# Patient Record
Sex: MALE | Race: BLACK OR AFRICAN AMERICAN | NOT HISPANIC OR LATINO | Employment: FULL TIME | URBAN - METROPOLITAN AREA
[De-identification: names, ages, dates, MRNs, and addresses within clinical notes are randomized per-mention and may not be internally consistent; named-entity substitution may affect disease eponyms.]

---

## 2020-05-18 ENCOUNTER — HOSPITAL ENCOUNTER (EMERGENCY)
Facility: OTHER | Age: 40
Discharge: HOME | End: 2020-05-18
Attending: EMERGENCY MEDICINE

## 2020-05-18 VITALS
RESPIRATION RATE: 18 BRPM | OXYGEN SATURATION: 99 % | TEMPERATURE: 98 F | SYSTOLIC BLOOD PRESSURE: 143 MMHG | BODY MASS INDEX: 25.45 KG/M2 | WEIGHT: 192 LBS | HEIGHT: 73 IN | DIASTOLIC BLOOD PRESSURE: 97 MMHG | HEART RATE: 93 BPM

## 2020-05-18 DIAGNOSIS — G54.0 NEUROGENIC THORACIC OUTLET SYNDROME: ICD-10-CM

## 2020-05-18 PROCEDURE — 96361 HYDRATE IV INFUSION ADD-ON: CPT

## 2020-05-18 PROCEDURE — 25000003 PHARM REV CODE 250: Performed by: EMERGENCY MEDICINE

## 2020-05-18 PROCEDURE — 96374 THER/PROPH/DIAG INJ IV PUSH: CPT

## 2020-05-18 PROCEDURE — 63600175 PHARM REV CODE 636 W HCPCS: Performed by: EMERGENCY MEDICINE

## 2020-05-18 PROCEDURE — 99284 EMERGENCY DEPT VISIT MOD MDM: CPT | Mod: 25

## 2020-05-18 RX ORDER — BUTALBITAL, ACETAMINOPHEN AND CAFFEINE 50; 325; 40 MG/1; MG/1; MG/1
1 TABLET ORAL
Status: COMPLETED | OUTPATIENT
Start: 2020-05-18 | End: 2020-05-18

## 2020-05-18 RX ORDER — SODIUM CHLORIDE 9 MG/ML
1000 INJECTION, SOLUTION INTRAVENOUS
Status: COMPLETED | OUTPATIENT
Start: 2020-05-18 | End: 2020-05-18

## 2020-05-18 RX ORDER — IBUPROFEN 200 MG
1 TABLET ORAL DAILY
Qty: 14 PATCH | Refills: 0 | Status: SHIPPED | OUTPATIENT
Start: 2020-05-18

## 2020-05-18 RX ORDER — KETOROLAC TROMETHAMINE 30 MG/ML
15 INJECTION, SOLUTION INTRAMUSCULAR; INTRAVENOUS
Status: COMPLETED | OUTPATIENT
Start: 2020-05-18 | End: 2020-05-18

## 2020-05-18 RX ADMIN — SODIUM CHLORIDE 1000 ML: 0.9 INJECTION, SOLUTION INTRAVENOUS at 04:05

## 2020-05-18 RX ADMIN — BUTALBITAL, ACETAMINOPHEN, AND CAFFEINE 1 TABLET: 50; 325; 40 TABLET ORAL at 04:05

## 2020-05-18 RX ADMIN — KETOROLAC TROMETHAMINE 15 MG: 30 INJECTION, SOLUTION INTRAMUSCULAR; INTRAVENOUS at 04:05

## 2020-05-18 NOTE — ED NOTES
"Pt c/o R sided HA x "a while"; Describes as pressure behind R eye and pressure to R temporal region, states he is wanting to get it checked out today; Denies recent fevers, chills, SOB, cough, dizziness, or blurred vision. Pt AAOx4 and appropriate at this time. Respirations even and unlabored. No acute distress noted.    "

## 2020-05-18 NOTE — ED PROVIDER NOTES
"Encounter Date: 5/18/2020    SCRIBE #1 NOTE: I, Nataly Lima, am scribing for, and in the presence of, Dr. Batista.       History     Chief Complaint   Patient presents with    Headache     Reports right side headache daily x 1 month     Time seen by provider: 4:03 PM    This is a 39 y.o. male who presents with complaint of right-sided headache that has been intermittent for the past month. He reports associated neck pain, chest pain, and right arm "numbness" that lasts a few moments. He reports onset of symptoms while lying in bed and notes that symptoms disturb his sleep. Patient states that the arm numbness is relieved when changing positions.     He notes his symptoms are temporarily relieved with tylenol. He denies any visual disturbances, weakness, cough, SOB, fever, or chills. Patient states he last had his vision checked when he was incarcerated three years ago. 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 Patient notes that he plays a lot of basketball with his children. Patient is right handed. He reports family history of HTN. Visual acuity is 20/20 in both eyes.      The history is provided by the patient.     Review of patient's allergies indicates:  No Known Allergies  No past medical history on file.  No past surgical history on file.  No family history on file.  Social History     Tobacco Use    Smoking status: Not on file   Substance Use Topics    Alcohol use: Not on file    Drug use: Not on file     Review of Systems   Constitutional: Negative for fever.   HENT: Negative for sore throat.    Eyes: Negative for photophobia, pain, redness and visual disturbance.   Respiratory: Negative for cough and shortness of breath.    Cardiovascular: Negative for chest pain.   Gastrointestinal: Negative for nausea.   Genitourinary: Negative for dysuria.   Musculoskeletal: Negative for back pain.        Right sided arm numbness   Skin: Negative for rash.   Neurological: Positive for numbness (RUE) " and headaches. Negative for dizziness, syncope, facial asymmetry, weakness and light-headedness.   Hematological: Does not bruise/bleed easily.       Physical Exam     Initial Vitals [05/18/20 1549]   BP Pulse Resp Temp SpO2   (!) 143/97 93 18 98.3 °F (36.8 °C) 99 %      MAP       --         Physical Exam    Nursing note and vitals reviewed.  Constitutional: He appears well-developed and well-nourished.   HENT:   Head: Normocephalic and atraumatic.   Eyes: Conjunctivae and EOM are normal. Pupils are equal, round, and reactive to light.   Neck: Normal range of motion. Neck supple.   Cardiovascular: Normal rate, regular rhythm, normal heart sounds and normal pulses.   No murmur heard.  RUE: 2+ distal pulses, unchanged when raising his right arm.   Pulmonary/Chest: Breath sounds normal. No respiratory distress.   Abdominal: Soft. There is no tenderness. There is no rebound and no guarding.   Musculoskeletal: Normal range of motion. He exhibits no tenderness.   Neurological: He is alert and oriented to person, place, and time. He has normal strength. No cranial nerve deficit or sensory deficit. GCS score is 15. GCS eye subscore is 4. GCS verbal subscore is 5. GCS motor subscore is 6.   5/5  strength. Neurovascularly intact.   Skin: Skin is warm and dry.   Psychiatric: He has a normal mood and affect. His behavior is normal. Thought content normal.         ED Course   Procedures  Labs Reviewed - No data to display       Imaging Results          CT Head Without Contrast (Final result)  Result time 05/18/20 16:51:29    Final result by Chucho Zavala MD (05/18/20 16:51:29)                 Impression:      1. No acute intracranial abnormalities.      Electronically signed by: Chucho Zavala MD  Date:    05/18/2020  Time:    16:51             Narrative:    EXAMINATION:  CT HEAD WITHOUT CONTRAST    CLINICAL HISTORY:  Headache, acute, norm neuro exam;    TECHNIQUE:  Low dose axial images were obtained through the head.   Coronal and sagittal reformations were also performed. Contrast was not administered.    COMPARISON:  None.    FINDINGS:  There is no evidence of acute major vascular territory infarct, hemorrhage, or mass.  There is no hydrocephalus.  There are no abnormal extra-axial fluid collections.  The paranasal sinuses and mastoid air cells are clear, and there is no evidence of calvarial fracture.  The visualized soft tissues are unremarkable.                               X-Ray Chest PA And Lateral (Final result)  Result time 05/18/20 16:39:38    Final result by Chucho Zavala MD (05/18/20 16:39:38)                 Impression:      1. No acute cardiopulmonary process.      Electronically signed by: Chucho Zavala MD  Date:    05/18/2020  Time:    16:39             Narrative:    EXAMINATION:  XR CHEST PA AND LATERAL    CLINICAL HISTORY:  Brachial plexus disorders    TECHNIQUE:  PA and lateral views of the chest were performed.    COMPARISON:  04/05/2018    FINDINGS:  The cardiomediastinal silhouette is not enlarged.  There is no pleural effusion.  The trachea is midline.  The lungs are symmetrically expanded bilaterally without evidence of acute parenchymal process. No large focal consolidation seen.  There is no pneumothorax.  The osseous structures are remarkable for mild dextroscoliotic curvature of the spine..                                 Medical Decision Making:   History:   Old Medical Records: I decided to obtain old medical records.  Initial Assessment:   Neurovascularly intact. No double vision. No visual field deficits. No coordination deficits. Plan- Head CT for night time awakening and persistent headache.  Differential Diagnosis:   Differential diagnosis includes: Thoracic Outlet Sydrome, Mass effect given duration of symptoms, Migraine, Tension headache, Cervical Migraine      Clinical Tests:   Lab Tests: Ordered and Reviewed  Radiological Study: Ordered and Reviewed  ED Management:  Patient is well  appearing and in no distress. Neurologically intact    He was instructed to follow closely with his primary care doctor, and a prescription for physical therapy was initiated here.  Discussed that his signs and symptoms suggestive of neurogenic thoracic outlet syndrome, not arterial or venous.  His pulse does not change, he has no arm swelling.  His neurologic exam was unremarkable.  He was given Fioricet here and his symptoms resolved.  He will follow-up            Scribe Attestation:   Scribe #1: I performed the above scribed service and the documentation accurately describes the services I performed. I attest to the accuracy of the note.    Attending Attestation:           Physician Attestation for Scribe:  Physician Attestation Statement for Scribe #1: I, Dr. Batista, reviewed documentation, as scribed by Nataly Lima in my presence, and it is both accurate and complete.                 ED Course as of May 18 1934   Mon May 18, 2020   1726 Discussed close follow up with physical therapy with suspected neurogenic TOS    [MG]      ED Course User Index  [MG] Sarah Batista MD                Clinical Impression:     1. Neurogenic thoracic outlet syndrome                ED Disposition Condition    Discharge Stable        ED Prescriptions     Medication Sig Dispense Start Date End Date Auth. Provider    nicotine (NICODERM CQ) 14 mg/24 hr Place 1 patch onto the skin once daily. 14 patch 5/18/2020  Sarah Batista MD        Follow-up Information    None                                    Sarah Batista MD  05/18/20 1934

## 2021-05-04 ENCOUNTER — OFFICE VISIT (OUTPATIENT)
Dept: URGENT CARE | Facility: CLINIC | Age: 41
End: 2021-05-04
Payer: MEDICAID

## 2021-05-04 VITALS
RESPIRATION RATE: 18 BRPM | OXYGEN SATURATION: 98 % | BODY MASS INDEX: 26.11 KG/M2 | HEART RATE: 89 BPM | SYSTOLIC BLOOD PRESSURE: 142 MMHG | TEMPERATURE: 98 F | WEIGHT: 197 LBS | DIASTOLIC BLOOD PRESSURE: 95 MMHG | HEIGHT: 73 IN

## 2021-05-04 DIAGNOSIS — U07.1 COVID-19: Primary | ICD-10-CM

## 2021-05-04 DIAGNOSIS — R05.9 COUGH: ICD-10-CM

## 2021-05-04 DIAGNOSIS — U07.1 COVID-19 VIRUS DETECTED: ICD-10-CM

## 2021-05-04 LAB
CTP QC/QA: YES
SARS-COV-2 RDRP RESP QL NAA+PROBE: POSITIVE

## 2021-05-04 PROCEDURE — 99203 OFFICE O/P NEW LOW 30 MIN: CPT | Mod: S$GLB,,, | Performed by: NURSE PRACTITIONER

## 2021-05-04 PROCEDURE — U0002: ICD-10-PCS | Mod: QW,S$GLB,, | Performed by: NURSE PRACTITIONER

## 2021-05-04 PROCEDURE — U0002 COVID-19 LAB TEST NON-CDC: HCPCS | Mod: QW,S$GLB,, | Performed by: NURSE PRACTITIONER

## 2021-05-04 PROCEDURE — 99203 PR OFFICE/OUTPT VISIT, NEW, LEVL III, 30-44 MIN: ICD-10-PCS | Mod: S$GLB,,, | Performed by: NURSE PRACTITIONER

## 2021-08-25 ENCOUNTER — IMMUNIZATION (OUTPATIENT)
Dept: INTERNAL MEDICINE | Facility: CLINIC | Age: 41
End: 2021-08-25
Payer: MEDICAID

## 2021-08-25 DIAGNOSIS — Z23 NEED FOR VACCINATION: Primary | ICD-10-CM

## 2021-08-25 PROCEDURE — 0001A COVID-19, MRNA, LNP-S, PF, 30 MCG/0.3 ML DOSE VACCINE: CPT | Mod: CV19,,, | Performed by: INTERNAL MEDICINE

## 2021-08-25 PROCEDURE — 91300 COVID-19, MRNA, LNP-S, PF, 30 MCG/0.3 ML DOSE VACCINE: ICD-10-PCS | Mod: ,,, | Performed by: INTERNAL MEDICINE

## 2021-08-25 PROCEDURE — 91300 COVID-19, MRNA, LNP-S, PF, 30 MCG/0.3 ML DOSE VACCINE: CPT | Mod: ,,, | Performed by: INTERNAL MEDICINE

## 2021-08-25 PROCEDURE — 0001A COVID-19, MRNA, LNP-S, PF, 30 MCG/0.3 ML DOSE VACCINE: ICD-10-PCS | Mod: CV19,,, | Performed by: INTERNAL MEDICINE

## 2021-09-17 ENCOUNTER — IMMUNIZATION (OUTPATIENT)
Dept: INTERNAL MEDICINE | Facility: CLINIC | Age: 41
End: 2021-09-17
Payer: MEDICAID

## 2021-09-17 DIAGNOSIS — Z23 NEED FOR VACCINATION: Primary | ICD-10-CM

## 2021-09-17 PROCEDURE — 91300 COVID-19, MRNA, LNP-S, PF, 30 MCG/0.3 ML DOSE VACCINE: ICD-10-PCS | Mod: ,,, | Performed by: INTERNAL MEDICINE

## 2021-09-17 PROCEDURE — 0002A COVID-19, MRNA, LNP-S, PF, 30 MCG/0.3 ML DOSE VACCINE: CPT | Mod: CV19,,, | Performed by: INTERNAL MEDICINE

## 2021-09-17 PROCEDURE — 91300 COVID-19, MRNA, LNP-S, PF, 30 MCG/0.3 ML DOSE VACCINE: CPT | Mod: ,,, | Performed by: INTERNAL MEDICINE

## 2021-09-17 PROCEDURE — 0002A COVID-19, MRNA, LNP-S, PF, 30 MCG/0.3 ML DOSE VACCINE: ICD-10-PCS | Mod: CV19,,, | Performed by: INTERNAL MEDICINE

## 2023-03-22 PROBLEM — A08.4 VIRAL GASTROENTERITIS: Status: ACTIVE | Noted: 2023-03-22

## 2024-06-25 ENCOUNTER — HOSPITAL ENCOUNTER (EMERGENCY)
Facility: OTHER | Age: 44
Discharge: HOME | End: 2024-06-25
Attending: EMERGENCY MEDICINE

## 2024-06-25 VITALS
RESPIRATION RATE: 18 BRPM | TEMPERATURE: 98 F | HEIGHT: 73 IN | OXYGEN SATURATION: 99 % | BODY MASS INDEX: 28.36 KG/M2 | DIASTOLIC BLOOD PRESSURE: 103 MMHG | SYSTOLIC BLOOD PRESSURE: 141 MMHG | HEART RATE: 86 BPM | WEIGHT: 214 LBS

## 2024-06-25 DIAGNOSIS — R51.9 NONINTRACTABLE HEADACHE, UNSPECIFIED CHRONICITY PATTERN, UNSPECIFIED HEADACHE TYPE: Primary | ICD-10-CM

## 2024-06-25 DIAGNOSIS — I10 HYPERTENSION, UNSPECIFIED TYPE: ICD-10-CM

## 2024-06-25 PROCEDURE — 99283 EMERGENCY DEPT VISIT LOW MDM: CPT

## 2024-06-25 RX ORDER — IBUPROFEN 600 MG/1
600 TABLET ORAL EVERY 6 HOURS PRN
Qty: 20 TABLET | Refills: 0 | Status: SHIPPED | OUTPATIENT
Start: 2024-06-25

## 2024-06-25 RX ORDER — AMLODIPINE BESYLATE 5 MG/1
5 TABLET ORAL DAILY
Qty: 90 TABLET | Refills: 0 | Status: SHIPPED | OUTPATIENT
Start: 2024-06-25 | End: 2025-06-25

## 2024-06-25 RX ORDER — LOSARTAN POTASSIUM 50 MG/1
1 TABLET ORAL DAILY
COMMUNITY
Start: 2024-05-02 | End: 2024-06-25

## 2024-06-25 RX ORDER — ACETAMINOPHEN 500 MG
1000 TABLET ORAL EVERY 6 HOURS PRN
Qty: 50 TABLET | Refills: 0 | Status: SHIPPED | OUTPATIENT
Start: 2024-06-25

## 2024-06-25 NOTE — DISCHARGE INSTRUCTIONS
Mr. Ardon,    Thank you for letting me care for you today! It was nice meeting you, and I hope you feel better soon.   If you would like access to your chart and what was done today please utilize the [facility].   Please come back to [facility] for all of your future medical needs.    Our goal in the emergency department is to always give you outstanding care and exceptional service. You may receive a survey by mail or e-mail in the next week regarding your experience in our ED. We would greatly appreciate you completing and returning the survey. Your feedback provides us with a way to recognize our staff who give very good care and it helps us learn how to improve when your experience was below our aspiration of excellence.     Sincerely,    Chris Pedersen MD  Board Certified Emergency Physician

## 2024-06-25 NOTE — ED TRIAGE NOTES
Placed on BP medication about 3 months ago after c/o frequent headaches. Sates medication changed about 1 month ago with higher dosage but continues with headaches. Denies blurred vision, CP, SOB, or dizziness. States compliant with meds and avoids salt intake. Presents awake, alert. No distress noted.

## 2024-06-25 NOTE — ED PROVIDER NOTES
Encounter Date: 6/25/2024       History     Chief Complaint   Patient presents with    Headache     Pt c/o frequent HA. Pain currently behind L eye, sometimes behind R eye. Recently started on BP meds which were intended to also help his HA. States his HA is very bad today. Denies blurry vision, nausea, vom, photo/phonophobia.     42 yo man with a history of hypertension treated with losartan as well as chronic headaches. He notes the headaches are episodic and associated with his use of losartan and generally improved with tylenol. He denies focal numbness or weakness, denies fevers or chills, denies any nausea or rash.       Review of patient's allergies indicates:  No Known Allergies  Past Medical History:   Diagnosis Date    Hypertension      History reviewed. No pertinent surgical history.  No family history on file.  Social History     Tobacco Use    Smoking status: Every Day     Current packs/day: 1.00     Average packs/day: 1 pack/day for 30.0 years (30.0 ttl pk-yrs)     Types: Cigarettes     Start date: 6/25/1994    Smokeless tobacco: Never   Substance Use Topics    Alcohol use: Not Currently    Drug use: Not Currently     Review of Systems  Constitutional-no fever  HEENT-no congestion  Eyes-no redness  Respiratory-no shortness of breath  Cardio-no chest pain  GI-no abdominal pain  Endocrine-no cold intolerance  -no difficulty urinating  MSK-no myalgias  Skin-no rashes  Allergy-no environmental allergy  Neurologic-, no headache  Hematology-no swollen nodes  Behavioral-no confusion   Physical Exam     Initial Vitals [06/25/24 1451]   BP Pulse Resp Temp SpO2   (!) 160/100 86 18 97.7 °F (36.5 °C) 99 %      MAP       --         Physical Exam  Constitutional: Well appearing, no distress.  Eyes: Conjunctivae normal.  ENT       Head: Normocephalic, atraumatic.       Nose: Normal external appearance        Mouth/Throat: no strigulous respirations   Hematological/Lymphatic/Immunilogical: no visible lymphadenopathy    Cardiovascular: Normal rate,   Respiratory: Normal respiratory effort.   Gastrointestinal: non distended   Musculoskeletal: Normal range of motion in all extremities. No obvious deformities or swelling.  Neurologic: Alert, oriented. Normal speech and language. No gross focal neurologic deficits are appreciated.  NIH 0  GCS- 15  CN2-12 intact  Normal sensation to light and deep touch in the bilateral face  Normal sensation to light and deep touch in the bilateral upper extremities  Normal sensation to light deep touch in the bilateral lower extremities  5/5 strength all 4 extremities  Normal gait  Negative rhomberg  No appreciable drift   Skin: Skin is warm, dry. No rash noted.  Psychiatric: Mood and affect are normal.    ED Course   Procedures  Labs Reviewed - No data to display         Imaging Results    None          Medications - No data to display  Medical Decision Making  Ddx- allergic reaction, hypertension, tension headache, med sensitivity     Headache associated with meds, mild htn here.  Will adjust meds.  Neuro intact and well appearing.  Low suspicion for CVA, IPH, IIH, based on history and exam.      Problems Addressed:  Hypertension, unspecified type: chronic illness or injury with exacerbation, progression, or side effects of treatment  Nonintractable headache, unspecified chronicity pattern, unspecified headache type: acute illness or injury    Amount and/or Complexity of Data Reviewed  External Data Reviewed: labs and notes.     Details: History of hypertension    Risk  OTC drugs.  Prescription drug management.                                      Clinical Impression:  Final diagnoses:  [R51.9] Nonintractable headache, unspecified chronicity pattern, unspecified headache type (Primary)  [I10] Hypertension, unspecified type          ED Disposition Condition    Discharge Stable          ED Prescriptions       Medication Sig Dispense Start Date End Date Auth. Provider    amLODIPine (NORVASC) 5 MG  tablet Take 1 tablet (5 mg total) by mouth once daily. 90 tablet 6/25/2024 6/25/2025 Chris Pedersen MD    acetaminophen (TYLENOL) 500 MG tablet Take 2 tablets (1,000 mg total) by mouth every 6 (six) hours as needed. 50 tablet 6/25/2024 -- Chris Pedersen MD    ibuprofen (ADVIL,MOTRIN) 600 MG tablet Take 1 tablet (600 mg total) by mouth every 6 (six) hours as needed for Pain. 20 tablet 6/25/2024 -- Chris Pedersen MD          Follow-up Information       Follow up With Specialties Details Why Contact Info    [facility] - Emergency Dept Emergency Medicine Go to  As needed, For a follow up visit about today [address] [facility] [id number] [phone]             Chris Pedersen MD  06/26/24 4071

## 2024-06-25 NOTE — FIRST PROVIDER EVALUATION
Emergency Department TeleTriage Encounter Note      CHIEF COMPLAINT    Chief Complaint   Patient presents with    Headache     Pt c/o frequent HA. Pain currently behind L eye, sometimes behind R eye. Recently started on BP meds which were intended to also help his HA. States his HA is very bad today. Denies blurry vision, nausea, vom, photo/phonophobia.       VITAL SIGNS   Initial Vitals [06/25/24 1451]   BP Pulse Resp Temp SpO2   (!) 160/100 86 18 97.7 °F (36.5 °C) 99 %      MAP       --            ALLERGIES    Review of patient's allergies indicates:  No Known Allergies    PROVIDER TRIAGE NOTE  Patient presents with complaint of headache for a year.      Phy:   Constitutional: well nourished, well developed, appearing stated age, NAD        Initial orders will be placed and care will be transferred to an alternate provider when patient is roomed for a full evaluation. Any additional orders and the final disposition will be determined by that provider.        ORDERS  Labs Reviewed   HIV 1 / 2 ANTIBODY   HEPATITIS C ANTIBODY       ED Orders (720h ago, onward)      Start Ordered     Status Ordering Provider    06/25/24 1454 06/25/24 1453  HIV 1/2 Ag/Ab (4th Gen)  STAT         Ordered KEVIN CRUZ    06/25/24 1454 06/25/24 1453  Hepatitis C Antibody  STAT         Ordered KEVIN CRUZ              Virtual Visit Note: The provider triage portion of this emergency department evaluation and documentation was performed via [employer], a HIPAA-compliant telemedicine application, in concert with a tele-presenter in the room. A face to face patient evaluation with one of my colleagues will occur once the patient is placed in an emergency department room.      DISCLAIMER: This note was prepared with [employer] voice recognition transcription software. Garbled syntax, mangled pronouns, and other bizarre constructions may be attributed to that software system.